# Patient Record
(demographics unavailable — no encounter records)

---

## 2024-10-24 NOTE — DISCUSSION/SUMMARY
[Term Infant] : Term infant [Family Adaptation] : family adaptation [Infant [place]] : infant independence [Feeding Routine] : feeding routine [Safety] : safety [] : The components of the vaccine(s) to be administered today are listed in the plan of care. The disease(s) for which the vaccine(s) are intended to prevent and the risks have been discussed with the caretaker.  The risks are also included in the appropriate vaccination information statements which have been provided to the patient's caregiver.  The caregiver has given consent to vaccinate. [FreeTextEntry1] : This is a 9 month old here for RTOV.   Growth and development have been progressing nicely. Continue breastmilk or formula as desired. Increase table foods, 3 meals with 2-3 snacks per day. Incorporate up to 6 oz of fluorinated water daily in a sippy cup.  Discussed weaning of bottle and pacifier usually by 15-18 months. Wipe teeth daily with washcloth and may use non fluoridated tooth . When in car, patient should be in rear-facing car seat in back seat. Put baby to sleep in own crib with no loose or soft bedding. Lower crib mattress. Help baby to maintain consistent daily routines and sleep schedule. Anticipate and recognize stranger anxiety. Ensure home is safe since baby is increasingly mobile. Be within arm's reach of baby at all times. Use consistent, positive discipline. Avoid screen time. Read aloud to baby.  Patient received Prevnar #2 and Hep B #3 today. Immunization and side effects discussed with caregiver, and agrees to have child immunized. TO RETURN FOR CATCH UP VACCINES. Prescription for routine blood work at 1-year-old checkup given.  Patient to return in 3 months for RTOV.

## 2024-10-24 NOTE — HISTORY OF PRESENT ILLNESS
[Formula ___ oz/feed] : [unfilled] oz of formula per feed [Vitamin ___] : Patient takes [unfilled] vitamins daily [Fruit] : fruit [Vegetables] : vegetables [Cereal] : cereal [Meat] : meat [Eggs] : eggs [Fish] : fish [Peanut] : peanut [Dairy] : dairy [Baby food] : baby food [Finger foods] : finger foods [Water] : water [___ voids per day] : [unfilled] voids per day [Frequency of stools: ___] : Frequency of stools: [unfilled]  stools [per day] : per day. [In Crib] : sleeps in crib [Co-sleeping] : no co-sleeping [Wakes up at night] : wakes up at night [Sleeps 12-16 hours per 24 hours (including naps)] : sleeps 12-16 hours per 24 hours (including naps) [Loose bedding, pillow, toys, and/or bumpers in crib] : no loose bedding, pillow, toys, and/or bumpers in crib [Pacifier use] : not using pacifier [Sippy Cup use] : sippy cup use [Bottle in bed] : not using bottle in bed [Brushing teeth] : brushing teeth [Up to date] : Up to date [FreeTextEntry7] : This patient has been healthy. They have had no ER or specialist visits this past year. [de-identified] : naps 2 x a day

## 2024-10-24 NOTE — DEVELOPMENTAL MILESTONES
[Normal Development] : Normal Development [None] : none [Uses basic gestures] : uses basic gestures [Says "Jovanni" or "Mama"] : says "Jovanni" or "Mama" nonspecifically [Sits well without support] : sits well without support [Transitions between sitting and lying] : transitions between sitting and lying [Balances on hands and knees] : balances on hands and knees [Crawls] : crawls [Picks up small objects with 3 fingers] : picks up small objects with 3 fingers and thumb [Releases objects intentionally] : releases objects intentionally [[place] objects together] : bangs objects together

## 2024-10-24 NOTE — HISTORY OF PRESENT ILLNESS
[Formula ___ oz/feed] : [unfilled] oz of formula per feed [Vitamin ___] : Patient takes [unfilled] vitamins daily [Fruit] : fruit [Vegetables] : vegetables [Cereal] : cereal [Meat] : meat [Eggs] : eggs [Fish] : fish [Peanut] : peanut [Dairy] : dairy [Baby food] : baby food [Finger foods] : finger foods [Water] : water [___ voids per day] : [unfilled] voids per day [Frequency of stools: ___] : Frequency of stools: [unfilled]  stools [per day] : per day. [In Crib] : sleeps in crib [Co-sleeping] : no co-sleeping [Wakes up at night] : wakes up at night [Sleeps 12-16 hours per 24 hours (including naps)] : sleeps 12-16 hours per 24 hours (including naps) [Loose bedding, pillow, toys, and/or bumpers in crib] : no loose bedding, pillow, toys, and/or bumpers in crib [Pacifier use] : not using pacifier [Sippy Cup use] : sippy cup use [Bottle in bed] : not using bottle in bed [Brushing teeth] : brushing teeth [Up to date] : Up to date [FreeTextEntry7] : This patient has been healthy. They have had no ER or specialist visits this past year. [de-identified] : naps 2 x a day

## 2024-10-24 NOTE — DISCUSSION/SUMMARY
[Term Infant] : Term infant [Family Adaptation] : family adaptation [Infant [place]] : infant independence [Feeding Routine] : feeding routine [Safety] : safety [] : The components of the vaccine(s) to be administered today are listed in the plan of care. The disease(s) for which the vaccine(s) are intended to prevent and the risks have been discussed with the caretaker.  The risks are also included in the appropriate vaccination information statements which have been provided to the patient's caregiver.  The caregiver has given consent to vaccinate. [FreeTextEntry1] : This is a 9 month old here for RTOV.   Growth and development have been progressing nicely. Continue breastmilk or formula as desired. Increase table foods, 3 meals with 2-3 snacks per day. Incorporate up to 6 oz of fluorinated water daily in a sippy cup.  Discussed weaning of bottle and pacifier usually by 15-18 months. Wipe teeth daily with washcloth and may use non fluoridated tooth . When in car, patient should be in rear-facing car seat in back seat. Put baby to sleep in own crib with no loose or soft bedding. Lower crib mattress. Help baby to maintain consistent daily routines and sleep schedule. Anticipate and recognize stranger anxiety. Ensure home is safe since baby is increasingly mobile. Be within arm's reach of baby at all times. Use consistent, positive discipline. Avoid screen time. Read aloud to baby.  Patient received Prevnar #2 and Hep B #3 today. Immunization and side effects discussed with caregiver, and agrees to have child immunized. TO RETURN FOR CATCH UP VACCINES. Prescription for routine blood work at 1-year-old checkup given.  Patient to return in 3 months for RTOV.

## 2024-10-24 NOTE — DEVELOPMENTAL MILESTONES
[Normal Development] : Normal Development [None] : none [Uses basic gestures] : uses basic gestures [Says "Jovanni" or "Mama"] : says "Jovanni" or "Mama" nonspecifically [Sits well without support] : sits well without support [Transitions between sitting and lying] : transitions between sitting and lying [Balances on hands and knees] : balances on hands and knees [Crawls] : crawls [Picks up small objects with 3 fingers] : picks up small objects with 3 fingers and thumb [Releases objects intentionally] : releases objects intentionally [[place] objects together] : bangs objects together

## 2025-02-06 NOTE — HISTORY OF PRESENT ILLNESS
[Father] : father [Cow's milk ___ oz/feed] : [unfilled] oz of Cow's milk per feed [Fruit] : fruit [Vegetables] : vegetables [Meat] : meat [Dairy] : dairy [Finger food] : finger food [Table food] : table food [Vitamin ___] : Patient takes [unfilled] vitamin daily [___ stools per day] : [unfilled]  stools per day [___ voids per day] : [unfilled] voids per day [On back] : On back [In crib] : In crib [Brushing teeth] : Brushing teeth [No] : Patient does not go to dentist yearly [Vitamin] : Primary Fluoride Source: Vitamin [Up to date] : Up to date [At risk for exposure to TB] : Not at risk for exposure to Tuberculosis [FreeTextEntry7] : 12-month-old here for routine visit and immunization patient has been healthy [de-identified] : Patient has been well had RSV in December [de-identified] : Patient has been doing cans of male formula advised dad to start whole milk with her and decrease volume to 16 ounces a day [FreeTextEntry3] : sleeps 11p-10 am naps 1-2

## 2025-02-06 NOTE — PHYSICAL EXAM
[Discharge] : no discharge [Palpable Masses] : no palpable masses [Murmurs] : no murmurs [Tender] : nontender [Distended] : nondistended [Hepatomegaly] : no hepatomegaly [Splenomegaly] : no splenomegaly [Clitoromegaly] : no clitoromegaly [Allis Sign] : negative Allis sign [Rash or Lesions] : no rash/lesions

## 2025-02-06 NOTE — HISTORY OF PRESENT ILLNESS
[Father] : father [Cow's milk ___ oz/feed] : [unfilled] oz of Cow's milk per feed [Fruit] : fruit [Vegetables] : vegetables [Meat] : meat [Dairy] : dairy [Finger food] : finger food [Table food] : table food [Vitamin ___] : Patient takes [unfilled] vitamin daily [___ stools per day] : [unfilled]  stools per day [___ voids per day] : [unfilled] voids per day [On back] : On back [In crib] : In crib [Brushing teeth] : Brushing teeth [No] : Patient does not go to dentist yearly [Vitamin] : Primary Fluoride Source: Vitamin [Up to date] : Up to date [At risk for exposure to TB] : Not at risk for exposure to Tuberculosis [FreeTextEntry7] : 12-month-old here for routine visit and immunization patient has been healthy [de-identified] : Patient has been well had RSV in December [de-identified] : Patient has been doing cans of male formula advised dad to start whole milk with her and decrease volume to 16 ounces a day [FreeTextEntry3] : sleeps 11p-10 am naps 1-2

## 2025-02-06 NOTE — DISCUSSION/SUMMARY
[Family Support] : family support [Establishing Routines] : establishing routines [Feeding and Appetite Changes] : feeding and appetite changes [Establishing A Dental Home] : establishing a dental home [Safety] : safety [FreeTextEntry1] : 13 month old is here for RTOV.  Growth and development have been appropriate for age. Transition to whole cow's milk.  Parents have been giving her follow-up formula [place]. Parents have been still giving her a lot of soft foods advised to encourage table foods, she needs to be introduced to textures.   3 meals with 2-3 snacks per day. Incorporate up to 6 oz of fluorinated water daily in a Sippy cup. Brush teeth twice a day with soft toothbrush. Recommend visit to dentist.  Parents have not been brushing her teeth advised that they must start to do this twice a day.  Sleep pattern discussed at length. When in car, keep child in rear-facing car seats until age 2, or until  the maximum height and weight for seat is reached. Put baby to sleep in own crib with no loose or soft bedding. Lower crib mattress. Help baby to maintain consistent daily routines and sleep schedule. Recognize stranger and separation anxiety. Ensure home is safe since baby is increasingly mobile. Be within arm's reach of baby at all times. Use consistent, positive discipline. Avoid screen time. Read aloud to baby.  Immunizations discussed and given today are -HIB #4 and Prevnar #3-this is patient's last dose of Prevnar as had received 2 doses prior to the first birthday.  VIS forms given and vaccine components and what they protect the child from was discussed with caretaker. Flu offered-and given today Father will return in 1 month for flu #2 Passed vision screening. Return to office for 15 month well visit.

## 2025-02-06 NOTE — HISTORY OF PRESENT ILLNESS
[Father] : father [Cow's milk ___ oz/feed] : [unfilled] oz of Cow's milk per feed [Fruit] : fruit [Vegetables] : vegetables [Meat] : meat [Dairy] : dairy [Finger food] : finger food [Table food] : table food [Vitamin ___] : Patient takes [unfilled] vitamin daily [___ stools per day] : [unfilled]  stools per day [___ voids per day] : [unfilled] voids per day [On back] : On back [In crib] : In crib [Brushing teeth] : Brushing teeth [No] : Patient does not go to dentist yearly [Vitamin] : Primary Fluoride Source: Vitamin [Up to date] : Up to date [At risk for exposure to TB] : Not at risk for exposure to Tuberculosis [FreeTextEntry7] : 12-month-old here for routine visit and immunization patient has been healthy [de-identified] : Patient has been well had RSV in December [de-identified] : Patient has been doing cans of male formula advised dad to start whole milk with her and decrease volume to 16 ounces a day [FreeTextEntry3] : sleeps 11p-10 am naps 1-2

## 2025-02-06 NOTE — DEVELOPMENTAL MILESTONES
[Normal Development] : Normal Development [None] : none [Looks for hidden objects] : looks for hidden objects [Imitates new gestures] : imitates new gestures [Says "Dad" or "Mom" with meaning] : says "Dad" or "Mom" with meaning [Uses one word other than Mom or] : uses one word other than Mom or Dad or personal names [Follows a verbal command that] : follows a verbal command that includes a gesture [Takes first independent] : takes first independent steps [Stands without support] : stands without support [Drops object in a cup] : drops object in a cup [Picks up small object with 2 finger] : picks up small object with 2 finger pincer grasp [Picks up food and eats it] : picks up food and eats it [FreeTextEntry1] : says 5 words ,